# Patient Record
Sex: FEMALE | Race: WHITE | NOT HISPANIC OR LATINO | URBAN - METROPOLITAN AREA
[De-identification: names, ages, dates, MRNs, and addresses within clinical notes are randomized per-mention and may not be internally consistent; named-entity substitution may affect disease eponyms.]

---

## 2019-06-21 ENCOUNTER — EMERGENCY (EMERGENCY)
Facility: HOSPITAL | Age: 44
LOS: 1 days | End: 2019-06-21
Admitting: EMERGENCY MEDICINE
Payer: MEDICAID

## 2019-06-21 PROCEDURE — 99284 EMERGENCY DEPT VISIT MOD MDM: CPT | Mod: 25

## 2019-06-22 PROCEDURE — 74177 CT ABD & PELVIS W/CONTRAST: CPT | Mod: 26

## 2019-06-22 PROCEDURE — 76705 ECHO EXAM OF ABDOMEN: CPT | Mod: 26

## 2019-11-04 ENCOUNTER — EMERGENCY (EMERGENCY)
Facility: HOSPITAL | Age: 44
LOS: 1 days | End: 2019-11-04
Admitting: EMERGENCY MEDICINE
Payer: MEDICAID

## 2019-11-04 PROCEDURE — 72125 CT NECK SPINE W/O DYE: CPT | Mod: 26

## 2019-11-04 PROCEDURE — 99284 EMERGENCY DEPT VISIT MOD MDM: CPT

## 2019-11-04 PROCEDURE — 73564 X-RAY EXAM KNEE 4 OR MORE: CPT | Mod: 26,LT

## 2019-11-04 PROCEDURE — 70486 CT MAXILLOFACIAL W/O DYE: CPT | Mod: 26

## 2019-11-04 PROCEDURE — 70450 CT HEAD/BRAIN W/O DYE: CPT | Mod: 26

## 2019-11-23 ENCOUNTER — EMERGENCY (EMERGENCY)
Facility: HOSPITAL | Age: 44
LOS: 1 days | End: 2019-11-23
Admitting: EMERGENCY MEDICINE
Payer: MEDICAID

## 2019-11-23 PROCEDURE — 70486 CT MAXILLOFACIAL W/O DYE: CPT | Mod: 26

## 2019-11-23 PROCEDURE — 72125 CT NECK SPINE W/O DYE: CPT | Mod: 26

## 2019-11-23 PROCEDURE — 25600 CLTX DST RDL FX/EPHYS SEP WO: CPT | Mod: 54

## 2019-11-23 PROCEDURE — 73130 X-RAY EXAM OF HAND: CPT | Mod: 26,LT

## 2019-11-23 PROCEDURE — 73564 X-RAY EXAM KNEE 4 OR MORE: CPT | Mod: 26,LT

## 2019-11-23 PROCEDURE — 12011 RPR F/E/E/N/L/M 2.5 CM/<: CPT | Mod: 59

## 2019-11-23 PROCEDURE — 21310: CPT

## 2019-11-23 PROCEDURE — 99284 EMERGENCY DEPT VISIT MOD MDM: CPT | Mod: 25

## 2019-11-23 PROCEDURE — 70450 CT HEAD/BRAIN W/O DYE: CPT | Mod: 26

## 2019-11-23 PROCEDURE — 73110 X-RAY EXAM OF WRIST: CPT | Mod: 26,LT

## 2019-11-25 ENCOUNTER — APPOINTMENT (OUTPATIENT)
Dept: ORTHOPEDIC SURGERY | Facility: CLINIC | Age: 44
End: 2019-11-25
Payer: MEDICAID

## 2019-11-25 VITALS — WEIGHT: 127 LBS | BODY MASS INDEX: 25.6 KG/M2 | HEIGHT: 59 IN

## 2019-11-25 DIAGNOSIS — Z78.9 OTHER SPECIFIED HEALTH STATUS: ICD-10-CM

## 2019-11-25 DIAGNOSIS — Z80.9 FAMILY HISTORY OF MALIGNANT NEOPLASM, UNSPECIFIED: ICD-10-CM

## 2019-11-25 DIAGNOSIS — Z82.61 FAMILY HISTORY OF ARTHRITIS: ICD-10-CM

## 2019-11-25 PROBLEM — Z00.00 ENCOUNTER FOR PREVENTIVE HEALTH EXAMINATION: Status: ACTIVE | Noted: 2019-11-25

## 2019-11-25 PROCEDURE — 99203 OFFICE O/P NEW LOW 30 MIN: CPT | Mod: 25

## 2019-11-25 PROCEDURE — 73110 X-RAY EXAM OF WRIST: CPT | Mod: LT

## 2019-11-25 PROCEDURE — 29075 APPL CST ELBW FNGR SHORT ARM: CPT | Mod: LT

## 2019-11-25 RX ORDER — ELECTROLYTES/DEXTROSE
SOLUTION, ORAL ORAL
Refills: 0 | Status: ACTIVE | COMMUNITY

## 2019-11-25 NOTE — PROCEDURE
[FreeTextEntry1] : She was placed into a well-padded and well molded left short arm fiberglass cast.  She and her mother were instructed on cast care and activity modification.  She will follow-up in 2 weeks.

## 2019-11-25 NOTE — PHYSICAL EXAM
[de-identified] : - Constitutional: This is a female in no obvious distress. She is accompanied by her mother.\par - Psych: She has a history of mental retardation.  However, she does respond to questioning.\par - Cardiovascular: Normal pulses throughout the upper extremities.  No significant varicosities are noted in the upper extremities. \par - Neuro: Strength and sensation are intact throughout the upper extremities.  Patient has normal coordination.\par - Respiratory:  Patient exhibits no evidence of shortness of breath or difficulty breathing.\par - Skin: No rashes, lesions, or other abnormalities are noted in the upper extremities.\par ---\par \par Examination of her left wrist and hand after the splint was removed demonstrates swelling along the distal radius dorsally.  She is tender in this region.  She has pain with motion.  She has appropriate motion of the digits.  There are no acute focal neurologic deficits noted distally. [de-identified] : I reviewed PA, lateral oblique [place] [facility] [place]  These demonstrate a nondisplaced left distal radius fracture.

## 2019-11-25 NOTE — HISTORY OF PRESENT ILLNESS
[Right] : right hand dominant [FreeTextEntry1] : She comes in today for evaluation of a left wrist fracture secondary to a mechanical fall that occurred 2 days ago. She was seen at [facility] emergency room and had X-rays taken. She denies any pain.\par \par She is accompanied by her mother today.  She has a history of mental retardation.  She was accompanied by her mother who provided the history, however, she does respond to questioning and is relatively alert.

## 2019-11-25 NOTE — ADDENDUM
[FreeTextEntry1] : I, Pavan Morfin, acted solely as a scribe for Dr. Samaniego on this date 11/25/2019.\par

## 2019-11-25 NOTE — END OF VISIT
[FreeTextEntry3] : All medical record entries made by the Scribrod were at my, Dr. Samaniego direction and personally dictated by me on 11/25/2019. I have reviewed the chart and agree that the record accurately reflects my personal performances of the history, physical exam, assessment and plan. I have also personally directed, reviewed, and agreed with the chart.\par

## 2019-11-25 NOTE — DISCUSSION/SUMMARY
[FreeTextEntry1] : She has findings consistent with a nondisplaced left distal radius fracture, status post a fall 2 days ago.  His symptoms\par \par I had a discussion regarding today's visit, the diagnosis, and treatment recommendations / options.  I recommended immobilization in a short arm cast today for approximately 6 weeks.\par \par The patient and her mother have agreed to this plan of management and has expressed full understanding.  All questions were fully answered to their satisfaction.\par \par Over 50% of the time spent with the patient was on counseling the patient on the above diagnosis, treatment plan and prognosis.\par

## 2019-12-09 ENCOUNTER — APPOINTMENT (OUTPATIENT)
Dept: ORTHOPEDIC SURGERY | Facility: CLINIC | Age: 44
End: 2019-12-09

## 2019-12-09 ENCOUNTER — APPOINTMENT (OUTPATIENT)
Dept: ORTHOPEDIC SURGERY | Facility: CLINIC | Age: 44
End: 2019-12-09
Payer: MEDICAID

## 2019-12-09 VITALS — HEIGHT: 59 IN | BODY MASS INDEX: 25.6 KG/M2 | WEIGHT: 127 LBS

## 2019-12-09 PROCEDURE — 99213 OFFICE O/P EST LOW 20 MIN: CPT

## 2019-12-09 PROCEDURE — 73110 X-RAY EXAM OF WRIST: CPT | Mod: LT

## 2019-12-09 NOTE — PHYSICAL EXAM
[de-identified] : - Constitutional: This is a female in no obvious distress. She is accompanied by her mother.\par - Psych: She has a history of mental retardation.  However, she does respond to questioning.\par - Cardiovascular: Normal pulses throughout the upper extremities.  No significant varicosities are noted in the upper extremities. \par - Neuro: Strength and sensation are intact throughout the upper extremities.  Patient has normal coordination.\par - Respiratory:  Patient exhibits no evidence of shortness of breath or difficulty breathing.\par - Skin: No rashes, lesions, or other abnormalities are noted in the upper extremities.\par ---\par Examination of her left wrist demonstrates her cast to be well fitting.  She has appropriate motion of the digits.  There are no acute focal neurologic deficits noted distally. [de-identified] : PA, lateral oblique radiographs of her left wrist demonstrate her distal radius fracture to be in acceptable alignment.  There is good alignment on the PA.  There is some settling noted on the lateral with minimal dorsal tilt.  However, the alignment is acceptable.

## 2019-12-09 NOTE — HISTORY OF PRESENT ILLNESS
[Right] : right hand dominant [FreeTextEntry1] : 16 days status post nondisplaced left distal radius fracture.  She was seen in the office 2 weeks ago and she was casted.\par \par She is doing well and notes minimal pain. \par \par She is accompanied by her mother today.  She has a history of mental retardation.  She was accompanied by her mother who provided the history, however, she does respond to questioning and is relatively alert.

## 2019-12-09 NOTE — ADDENDUM
[FreeTextEntry1] : I, Pavan Morfin, acted solely as a scribe for Dr. Samaniego on this date 12/09/2019.\par

## 2019-12-09 NOTE — DISCUSSION/SUMMARY
[FreeTextEntry1] : I had a discussion regarding today's visit, the diagnosis, and my treatment recommendations.  Further treatment options / recommendations were discussed.  At this time, I recommend maintenance of the cast and follow-up in 2 weeks for x-rays out of plaster.  Her mother understands that at 2 weeks the fracture may not be healed enough and that she may require replacement of the cast for 2 more weeks.  However, I would like to see her before the holidays.  In addition, I did discuss the radiographic findings with her and the fact that the fracture did settle a small amount.  However, the alignment is acceptable.\par \par The patient and her mother have agreed to this plan of management and has expressed full understanding.  All questions were fully answered to their satisfaction.\par \par I spent at least 25 minutes of face-to-face time with the patient.  Over 50% of this time was spent on counseling the patient on the above diagnosis, treatment plan and prognosis.

## 2019-12-09 NOTE — END OF VISIT
[FreeTextEntry3] : All medical record entries made by the Kendallibrod were at my, Dr. Samaniego direction and personally dictated by me on 12/09/2019. I have reviewed the chart and agree that the record accurately reflects my personal performances of the history, physical exam, assessment and plan. I have also personally directed, reviewed, and agreed with the chart.\par

## 2019-12-23 ENCOUNTER — APPOINTMENT (OUTPATIENT)
Dept: ORTHOPEDIC SURGERY | Facility: CLINIC | Age: 44
End: 2019-12-23
Payer: MEDICAID

## 2019-12-23 VITALS — BODY MASS INDEX: 25.6 KG/M2 | HEIGHT: 59 IN | WEIGHT: 127 LBS

## 2019-12-23 PROCEDURE — 73110 X-RAY EXAM OF WRIST: CPT | Mod: LT

## 2019-12-23 PROCEDURE — 99213 OFFICE O/P EST LOW 20 MIN: CPT | Mod: 25

## 2019-12-23 PROCEDURE — 29075 APPL CST ELBW FNGR SHORT ARM: CPT | Mod: LT

## 2019-12-23 NOTE — DISCUSSION/SUMMARY
[FreeTextEntry1] : Further treatment recommendations were discussed.  At this time, given her persistent tenderness, I recommended further immobilization for another 2 to 3 weeks.  I discussed a splint with her and her mother.  However, her mother does not believe that she would be fully compliant with a splint so would rather have a cast replaced.  I agreed.  Finally, I did discuss the radiographic findings with her mother.  Although there is some displacement, I believe that she should have an acceptable functional outcome.  I would not recommend surgical intervention, and her mother agrees.\par \par The patient and her mother have agreed to this plan of management and has expressed full understanding.  All questions were fully answered to their satisfaction.\par \par I spent at least 25 minutes of face-to-face time with the patient.  Over 50% of this time was spent on counseling the patient on the above diagnosis, treatment plan and prognosis.

## 2019-12-23 NOTE — HISTORY OF PRESENT ILLNESS
[Right] : right hand dominant [FreeTextEntry1] : 30 days status post nondisplaced left distal radius fracture.  \par \par She is doing well.  She has mild pain.\par \par She is accompanied by her mother today.  She has a history of mental retardation.  She was accompanied by her mother who provided the history, however, she does respond to questioning and is relatively alert.

## 2019-12-23 NOTE — PHYSICAL EXAM
[de-identified] : PA, lateral oblique radiographs of her left wrist demonstrate her distal radius fracture to be healing.  Again, there is some shortening and approximately 5 to 10 degrees of dorsal angulation. [de-identified] : - Constitutional: This is a female in no obvious distress. She is accompanied by her mother.\par - Psych: She has a history of mental retardation.  However, she does respond to questioning.\par - Cardiovascular: Normal pulses throughout the upper extremities.  No significant varicosities are noted in the upper extremities. \par - Neuro: Strength and sensation are intact throughout the upper extremities.  Patient has normal coordination.\par - Respiratory:  Patient exhibits no evidence of shortness of breath or difficulty breathing.\par - Skin: No rashes, lesions, or other abnormalities are noted in the upper extremities.\par ---\par Examination of her left wrist after her cast was removed demonstrates decreased swelling.  There is mild tenderness along the distal radius dorsally.  She has some pain with motion.  There are no focal neurologic deficits noted distally.

## 2019-12-23 NOTE — PROCEDURE
[FreeTextEntry1] : She was placed back into a well-padded and well-molded left short arm fiberglass cast.  She and her mother were instructed on cast care and she will follow-up within 3 weeks for x-rays out of plaster.

## 2019-12-23 NOTE — ADDENDUM
[FreeTextEntry1] : I, Pavan Morfin, acted solely as a scribe for Dr. Samaniego on this date 12/23/2019.\par

## 2019-12-23 NOTE — END OF VISIT
[FreeTextEntry3] : All medical record entries made by the Scribe were at my, Dr. Samaniego direction and personally dictated by me on 12/23/2019. I have reviewed the chart and agree that the record accurately reflects my personal performances of the history, physical exam, assessment and plan. I have also personally directed, reviewed, and agreed with the chart.\par

## 2020-01-09 PROBLEM — S52.502A DISTAL RADIUS FRACTURE, LEFT: Status: ACTIVE | Noted: 2019-11-25

## 2020-01-13 ENCOUNTER — APPOINTMENT (OUTPATIENT)
Dept: ORTHOPEDIC SURGERY | Facility: CLINIC | Age: 45
End: 2020-01-13
Payer: MEDICAID

## 2020-01-13 VITALS — HEIGHT: 59 IN | WEIGHT: 127 LBS | BODY MASS INDEX: 25.6 KG/M2

## 2020-01-13 DIAGNOSIS — S52.502A UNSPECIFIED FRACTURE OF THE LOWER END OF LEFT RADIUS, INITIAL ENCOUNTER FOR CLOSED FRACTURE: ICD-10-CM

## 2020-01-13 PROCEDURE — 99213 OFFICE O/P EST LOW 20 MIN: CPT

## 2020-01-13 PROCEDURE — 73110 X-RAY EXAM OF WRIST: CPT | Mod: LT

## 2020-01-13 NOTE — PHYSICAL EXAM
[de-identified] : - Constitutional: This is a female in no obvious distress. She is accompanied by her mother.\par - Psych: She has a history of mental retardation.  However, she does respond to questioning.\par - Cardiovascular: Normal pulses throughout the upper extremities.  No significant varicosities are noted in the upper extremities. \par - Neuro: Strength and sensation are intact throughout the upper extremities.  Patient has normal coordination.\par - Respiratory:  Patient exhibits no evidence of shortness of breath or difficulty breathing.\par - Skin: No rashes, lesions, or other abnormalities are noted in the upper extremities.\par ---\par \par Examination of her left wrist after the cast was removed demonstrates decreased swelling.  There is possibly mild tenderness along the distal radius dorsally.  She has minimal pain with motion.  There are no focal neurologic deficits noted distally. [de-identified] : PA, lateral oblique radiographs of her left wrist demonstrate her distal radius fracture to be essentially healed.  Again, there is some shortening and approximately 5 to 10 degrees of dorsal angulation.

## 2020-01-13 NOTE — END OF VISIT
[FreeTextEntry3] : All medical record entries made by the Scribe were at my, Dr. Samaniego, direction and personally dictated by me on 01/13/2020. I have reviewed the chart and agree that the record accurately reflects my personal performances of the history, physical exam, assessment, and plan. I have also personally directed, reviewed, and agreed with the chart.\par

## 2020-01-13 NOTE — ADDENDUM
[FreeTextEntry1] : I, Pavan Morfin, acted solely as a scribe for Dr. Samaniego on this date 01/13/2020.\par

## 2020-01-13 NOTE — HISTORY OF PRESENT ILLNESS
[Right] : right hand dominant [FreeTextEntry1] : 51 days status post nondisplaced left distal radius fracture.  \par \par She is doing well.  She has mild pain.\par \par She is accompanied by her mother today.  She has a history of mental retardation.  She was accompanied by her mother who provided the history, however, she does respond to questioning and is relatively alert.

## 2020-01-13 NOTE — DISCUSSION/SUMMARY
[FreeTextEntry1] : Further treatment recommendations were discussed.  At this time, she can remain out of the cast.  Her mother was instructed on protection with a soft support as needed and range of motion exercises.  She deferred a course of hand therapy.  She was given clearance to return to work. She will follow-up in 3 to 4 weeks if needed, according to her symptoms.\par \par The patient and her mother have agreed to this plan of management and has expressed full understanding.  All questions were fully answered to their satisfaction.\par \par I spent at least 25 minutes of face-to-face time with the patient.  Over 50% of this time was spent on counseling the patient on the above diagnosis, treatment plan and prognosis.

## 2021-09-17 ENCOUNTER — OUTPATIENT (OUTPATIENT)
Dept: OUTPATIENT SERVICES | Facility: HOSPITAL | Age: 46
LOS: 1 days | End: 2021-09-17

## 2021-09-20 ENCOUNTER — APPOINTMENT (OUTPATIENT)
Dept: MAMMOGRAPHY | Facility: CLINIC | Age: 46
End: 2021-09-20
Payer: MEDICAID

## 2021-09-20 PROCEDURE — 77067 SCR MAMMO BI INCL CAD: CPT

## 2021-10-07 ENCOUNTER — OUTPATIENT (OUTPATIENT)
Dept: OUTPATIENT SERVICES | Facility: HOSPITAL | Age: 46
LOS: 1 days | End: 2021-10-07

## 2021-10-28 ENCOUNTER — OUTPATIENT (OUTPATIENT)
Dept: OUTPATIENT SERVICES | Facility: HOSPITAL | Age: 46
LOS: 1 days | End: 2021-10-28

## 2022-03-01 ENCOUNTER — OUTPATIENT (OUTPATIENT)
Dept: OUTPATIENT SERVICES | Facility: HOSPITAL | Age: 47
LOS: 1 days | End: 2022-03-01

## 2022-03-01 DIAGNOSIS — R30.0 DYSURIA: ICD-10-CM

## 2022-07-07 ENCOUNTER — APPOINTMENT (OUTPATIENT)
Dept: RADIOLOGY | Facility: CLINIC | Age: 47
End: 2022-07-07

## 2022-07-07 PROCEDURE — 77080 DXA BONE DENSITY AXIAL: CPT

## 2023-08-28 ENCOUNTER — APPOINTMENT (OUTPATIENT)
Dept: MAMMOGRAPHY | Facility: CLINIC | Age: 48
End: 2023-08-28
Payer: MEDICARE

## 2023-08-28 PROCEDURE — 77063 BREAST TOMOSYNTHESIS BI: CPT

## 2023-08-28 PROCEDURE — 77067 SCR MAMMO BI INCL CAD: CPT
